# Patient Record
Sex: FEMALE | Race: WHITE
[De-identification: names, ages, dates, MRNs, and addresses within clinical notes are randomized per-mention and may not be internally consistent; named-entity substitution may affect disease eponyms.]

---

## 2017-06-04 ENCOUNTER — HOSPITAL ENCOUNTER (OUTPATIENT)
Dept: HOSPITAL 45 - C.LAB | Age: 62
End: 2017-06-04
Attending: GENERAL ACUTE CARE HOSPITAL
Payer: COMMERCIAL

## 2017-06-04 DIAGNOSIS — Z02.83: Primary | ICD-10-CM

## 2018-01-30 ENCOUNTER — HOSPITAL ENCOUNTER (OUTPATIENT)
Dept: HOSPITAL 45 - C.GI | Age: 63
Discharge: HOME | End: 2018-01-30
Attending: INTERNAL MEDICINE
Payer: COMMERCIAL

## 2018-01-30 VITALS
BODY MASS INDEX: 40.26 KG/M2 | WEIGHT: 250.53 LBS | BODY MASS INDEX: 40.26 KG/M2 | HEIGHT: 65.98 IN | BODY MASS INDEX: 40.26 KG/M2 | HEIGHT: 65.98 IN | WEIGHT: 250.53 LBS

## 2018-01-30 VITALS — HEART RATE: 70 BPM | OXYGEN SATURATION: 94 % | SYSTOLIC BLOOD PRESSURE: 144 MMHG | DIASTOLIC BLOOD PRESSURE: 101 MMHG

## 2018-01-30 DIAGNOSIS — K57.30: ICD-10-CM

## 2018-01-30 DIAGNOSIS — Z86.010: Primary | ICD-10-CM

## 2018-01-30 NOTE — ANESTHESIOLOGY PROGRESS NOTE
Anesthesia Post Op Note


Date & Time


Jan 30, 2018 at 10:22





Vital Signs


Pain Intensity:  0





Vital Signs Past 12 Hours








  Date Time  Temp Pulse Resp B/P (MAP) Pulse Ox O2 Delivery O2 Flow Rate FiO2


 


1/30/18 09:45  70 18 144/101 (115) 94 Room Air  


 


1/30/18 09:37  70 18 158/92 (114) 97 Room Air  


 


1/30/18 09:18  79 16 122/77 (92) 97 Room Air  


 


1/30/18 08:03 36.7 76 16 162/91 (114) 94 Room Air  











Notes


Mental Status:  alert / awake / arousable, participated in evaluation


Pt Amnestic to Procedure:  Yes


Nausea / Vomiting:  adequately controlled


Pain:  adequately controlled


Airway Patency, RR, SpO2:  stable & adequate


BP & HR:  stable & adequate


Hydration State:  stable & adequate


Anesthetic Complications:  no major complications apparent

## 2018-01-30 NOTE — GI REPORT
Procedure Date: 1/30/2018 8:52 AM

Procedure:            Colonoscopy

Indications:          Surveillance: Personal history of adenomatous polyps on 

                      last colonoscopy 5 years ago

Medicines:            Propofol per Anesthesia

Complications:        No immediate complications. Estimated blood loss: None.

Estimated Blood Loss: Estimated blood loss: none.

Procedure:            Pre-Anesthesia Assessment:

                      - Prior to the procedure, a History and Physical was 

                      performed, and patient medications, allergies and 

                      sensitivities were reviewed. The patient's tolerance of 

                      previous anesthesia was reviewed.

                      - The risks and benefits of the procedure and the 

                      sedation options and risks were discussed with the 

                      patient. All questions were answered and informed 

                      consent was obtained.

                      - Patient identification and proposed procedure were 

                      verified prior to the procedure by the physician and 

                      the nurse. The procedure was verified in the 

                      pre-procedure area in the procedure room.

                      - Mental Status Examination: alert and oriented. Airway 

                      Examination: normal oropharyngeal airway and neck 

                      mobility. Respiratory Examination: clear to 

                      auscultation. CV Examination: normal. Abdominal 

                      Examination: bowel sounds present, abdomen soft and 

                      non-tender, no masses or organomegaly noted.

                      - ASA Grade Assessment: II - A patient with mild 

                      systemic disease.

                      After I obtained informed consent, the scope was passed 

                      under direct vision. Throughout the procedure, the 

                      patient's blood pressure, pulse, and oxygen saturations 

                      were monitored continuously. The scope was introduced 

                      through the anus and advanced to the terminal ileum. 

                      The colonoscopy was performed without difficulty. The 

                      patient tolerated the procedure well. The quality of 

                      the bowel preparation was good.

Findings:

     The perianal and digital rectal examinations were normal. Pertinent 

     negatives include normal sphincter tone and no palpable rectal lesions.

     The terminal ileum appeared normal.

     Multiple small and large-mouthed diverticula were found in the sigmoid 

     colon.

     The retroflexed view of the distal rectum and anal verge was normal and 

     showed no anal or rectal abnormalities.

Impression:           - The examined portion of the ileum was normal.

                      - Diverticulosis in the sigmoid colon.

                      - The distal rectum and anal verge are normal on 

                      retroflexion view.

                      - No specimens collected.

Recommendation:       - Repeat colonoscopy in 5 years for surveillance.

                      - Return to referring physician as previously scheduled.

                      - Discharge patient to home.

Cherie Bird D.O.

Cherie Bird DO

1/30/2018 9:17:41 AM

This report has been signed electronically.

Note Initiated On: 1/30/2018 8:52 AM

     I attest to the content of the Intraoperative Record and orders 

     documented therein, exceptions below

## 2018-01-30 NOTE — ENDO HISTORY AND PHYSICAL
History & Physical


Date of Service:


Jan 30, 2018.


Chief Complaint:


HX OF POLYPS


Referring Physician:


DR. BONDS


History of Present Illness


h/o polyps





Past Surgical History


Hx Internal Defibrillator:  No


Hx Pacemaker:  No


Hx of Implantable Prosthesis:  No


Hx Post-Op Nausea and Vomiting:  No


Hx Cancer Surgery:  No


Hx Thoracic Surgery:  No


Hx Orthopedic:  No


Hx Urinary Tract Surgery:  No





Family History


None





Social History


Smoking Status:  Never Smoker


Hx Substance Use:  No


Hx Alcohol Use:  Yes (OCCASIONALLY)





Allergies


Coded Allergies:  


     No Known Allergies (Unverified , 1/30/18)





Current Medications





Reported Home Medications








 Medications  Dose


 Route/Sig


 Max Daily Dose Days Date Category


 


 Advair Diskus


 250/50 60 Dose


  (Fluticasone


 Prop/Salmeterol)


 1 Ea Aerp  1 Puff


 INH DAILY


    1/23/18 Reported


 


 Ultram (Tramadol


 HCl) 50 Mg Tab  50 Mg


 PO Q8H PRN


    1/23/18 Reported


 


 Lexapro


  (Escitalopram


 Oxalate) 10 Mg Tab  10 Mg


 PO QAM


    1/23/18 Reported











Vital Signs


Weight (Kilograms):  113.64


Height (Feet):  5


Height (Inches):  6











  Date Time  Temp Pulse Resp B/P (MAP) Pulse Ox O2 Delivery O2 Flow Rate FiO2


 


1/30/18 08:03 36.7 76 16 162/91 (114) 94 Room Air  











Physical Exam


General Appearance:  WD/WN, no apparent distress





Assessment and Plan


Colonoscopy today

## 2018-01-30 NOTE — DISCHARGE INSTRUCTIONS
Endoscopy Patient Instructions


Date / Procedure(s) Performed


Jan 30, 2018.


Colonoscopy





Allergy Information


Coded Allergies:  


     No Known Allergies (Unverified , 1/30/18)





Discharge Date / Findings


Jan 30, 2018.


diverticulosis





Medication Instructions


Restart Stopped Medication(s):


OK to resume all home medications


as above





Provider Instructions





Activity Restrictions





-  No exercising or heavy lifting for 24 hours. 


-  Do not drink alcohol the day of the procedure.


-  Do not drive a car or operate machinery until the day after the procedure.


-  Do not make any important decisions or sign important papers in 24 hours 

after the procedure.





Following Day:





-  Return to full activity which may include returning to work/school.





Diet





Start your diet with liquids and light foods (jello, soup, juice, toast).  Then 

eat your usual diet if not nauseated.





Treatment For Common After Affects





For mild abdominal pain, bloating, or excessive gas:





-  Rest


-  Eat lightly


-  Lie on right side





Follow-Up Information


Follow-up with DR. BONDS as scheduled





Anesthesia Information





What You Should Know





You have had a procedure that required some medicine to reduce anxiety and 

discomfort. This treatment is called moderate sedation.  


After receiving the treatment, you may be sleepy, but you will be able to 

breathe on your own.  The effects of the treatment may last for several hours.








Follow these instructions along with Activity/Diet recommendations noted above:





*  Do NOT do anything where dizziness or clumsiness would be dangerous.





*  Rest quietly at home today, then you can be up and about tomorrow.





*  Have a responsible person stay with you the rest of today.





*  You may have had an I.V. today.  If so, you may take the dressing off later 

today.





Recommendations


 


Call your doctor if:





*  Trouble breathing 





*  Continuous vomiting for more than 24 hours








*  Temperature above 101 degrees





*  Severe abdominal pain or bloating





*  Pain not relieved by pain medicine ordered





*  There is increased drainage or redness from any incision





*  A large amount of rectal bleeding greater than 2-3 tablespoons. 


   (If you had a polyp/s removed or have hemorrhoids, a small amount of blood -


    from the rectum is to be expected.)





*  You have any unanswered questions or concerns.








IN THE EVENT OF A SERIOUS EMERGENCY, GO TO THE NEAREST EMERGENCY ROOM








       Your discharge instructions were prepared by provider Cherie Bird.





 Patient Instructions Signature Page














Melanie Lillian 











Patient (or Guardian) Signature/Date:____________________________________ I 

have read and understand the instructions given to me by my caregivers.








Caregiver/RN/Doctor Signature/Date:____________________________________











The above-named patient and/or guardian has received patient instructions on 

this date.





























+  Original Patient Signature Page (only) stays with chart.  Please make copy 

for patient.